# Patient Record
Sex: MALE | Race: WHITE
[De-identification: names, ages, dates, MRNs, and addresses within clinical notes are randomized per-mention and may not be internally consistent; named-entity substitution may affect disease eponyms.]

---

## 2019-03-13 ENCOUNTER — HOSPITAL ENCOUNTER (EMERGENCY)
Dept: HOSPITAL 90 - EDH | Age: 84
Discharge: HOME | End: 2019-03-13
Payer: MEDICARE

## 2019-03-13 DIAGNOSIS — Z95.1: ICD-10-CM
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
## 2019-03-13 DIAGNOSIS — S22.31XA: Primary | ICD-10-CM

## 2019-03-13 DIAGNOSIS — J44.9: ICD-10-CM

## 2019-03-13 DIAGNOSIS — E11.9: ICD-10-CM

## 2019-03-13 DIAGNOSIS — I25.810: ICD-10-CM
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
## 2019-03-13 DIAGNOSIS — Z85.528: ICD-10-CM
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
## 2019-03-13 DIAGNOSIS — Z85.118: ICD-10-CM

## 2019-03-13 DIAGNOSIS — Z98.890: ICD-10-CM

## 2019-03-13 DIAGNOSIS — Z87.891: ICD-10-CM

## 2019-03-13 LAB
ALBUMIN SERPL-MCNC: 3.6 G/DL (ref 3.5–5)
ALT SERPL-CCNC: 39 U/L (ref 12–78)
AST SERPL-CCNC: 42 U/L (ref 10–37)
BASOPHILS NFR BLD AUTO: 1.1 % (ref 0–5)
BILIRUB SERPL-MCNC: 1.4 MG/DL (ref 0.2–1)
BUN SERPL-MCNC: 31 MG/DL (ref 7–18)
CHLORIDE SERPL-SCNC: 101 MMOL/L (ref 101–111)
CO2 SERPL-SCNC: 30 MMOL/L (ref 21–32)
CREAT SERPL-MCNC: 1.6 MG/DL (ref 0.5–1.5)
EOSINOPHIL NFR BLD AUTO: 1.7 % (ref 0–8)
ERYTHROCYTE [DISTWIDTH] IN BLOOD BY AUTOMATED COUNT: 15 % (ref 11–15.5)
GFR SERPL CREATININE-BSD FRML MDRD: 44 ML/MIN (ref 60–?)
GLUCOSE SERPL-MCNC: 131 MG/DL (ref 70–105)
HCT VFR BLD AUTO: 41.9 % (ref 42–54)
LYMPHOCYTES NFR SPEC AUTO: 15.7 % (ref 21–51)
MCH RBC QN AUTO: 33.9 PG (ref 27–33)
MCHC RBC AUTO-ENTMCNC: 33.2 G/DL (ref 32–36)
MCV RBC AUTO: 102.2 FL (ref 79–99)
MONOCYTES NFR BLD AUTO: 8.9 % (ref 3–13)
NEUTROPHILS NFR BLD AUTO: 72.6 % (ref 40–77)
NRBC BLD MANUAL-RTO: 0.1 % (ref 0–0.19)
PLATELET # BLD AUTO: 123 K/UL (ref 130–400)
POTASSIUM SERPL-SCNC: 3 MMOL/L (ref 3.5–5.1)
PROT SERPL-MCNC: 7.2 G/DL (ref 6–8.3)
RBC # BLD AUTO: 4.11 MIL/UL (ref 4.5–6.2)
SODIUM SERPL-SCNC: 141 MMOL/L (ref 136–145)
WBC # BLD AUTO: 7.7 K/UL (ref 4.8–10.8)

## 2019-03-13 PROCEDURE — 84484 ASSAY OF TROPONIN QUANT: CPT

## 2019-03-13 PROCEDURE — 93005 ELECTROCARDIOGRAM TRACING: CPT

## 2019-03-13 PROCEDURE — 71101 X-RAY EXAM UNILAT RIBS/CHEST: CPT

## 2019-03-13 PROCEDURE — 36415 COLL VENOUS BLD VENIPUNCTURE: CPT

## 2019-03-13 PROCEDURE — 85025 COMPLETE CBC W/AUTO DIFF WBC: CPT

## 2019-03-13 PROCEDURE — 80053 COMPREHEN METABOLIC PANEL: CPT

## 2019-03-13 PROCEDURE — 71250 CT THORAX DX C-: CPT

## 2019-03-30 ENCOUNTER — HOSPITAL ENCOUNTER (EMERGENCY)
Dept: HOSPITAL 90 - EDH | Age: 84
Discharge: HOME | End: 2019-03-30
Payer: MEDICARE

## 2019-03-30 DIAGNOSIS — Z85.118: ICD-10-CM

## 2019-03-30 DIAGNOSIS — R09.02: ICD-10-CM

## 2019-03-30 DIAGNOSIS — J44.9: ICD-10-CM

## 2019-03-30 DIAGNOSIS — E87.6: ICD-10-CM

## 2019-03-30 DIAGNOSIS — Z85.528: ICD-10-CM

## 2019-03-30 DIAGNOSIS — I50.1: ICD-10-CM

## 2019-03-30 DIAGNOSIS — M25.461: Primary | ICD-10-CM

## 2019-03-30 DIAGNOSIS — N18.9: ICD-10-CM

## 2019-03-30 DIAGNOSIS — I25.810: ICD-10-CM

## 2019-03-30 DIAGNOSIS — E11.22: ICD-10-CM

## 2019-03-30 LAB
APTT PPP: 30.7 SEC (ref 26.3–35.5)
BASOPHILS NFR BLD AUTO: 1 % (ref 0–5)
BUN SERPL-MCNC: 34 MG/DL (ref 7–18)
CHLORIDE SERPL-SCNC: 100 MMOL/L (ref 101–111)
CO2 SERPL-SCNC: 28 MMOL/L (ref 21–32)
CREAT SERPL-MCNC: 1.7 MG/DL (ref 0.5–1.5)
EOSINOPHIL NFR BLD AUTO: 1.2 % (ref 0–8)
ERYTHROCYTE [DISTWIDTH] IN BLOOD BY AUTOMATED COUNT: 15.5 % (ref 11–15.5)
GFR SERPL CREATININE-BSD FRML MDRD: 41 ML/MIN (ref 60–?)
GLUCOSE SERPL-MCNC: 157 MG/DL (ref 70–105)
HCT VFR BLD AUTO: 41.8 % (ref 42–54)
INR PPP: 1.02 (ref 0.85–1.15)
LYMPHOCYTES NFR SPEC AUTO: 12.7 % (ref 21–51)
MCH RBC QN AUTO: 34.4 PG (ref 27–33)
MCHC RBC AUTO-ENTMCNC: 33.4 G/DL (ref 32–36)
MCV RBC AUTO: 102.9 FL (ref 79–99)
MONOCYTES NFR BLD AUTO: 9.2 % (ref 3–13)
NEUTROPHILS NFR BLD AUTO: 75.9 % (ref 40–77)
NRBC BLD MANUAL-RTO: 0 % (ref 0–0.19)
PLATELET # BLD AUTO: 121 K/UL (ref 130–400)
POTASSIUM SERPL-SCNC: 2.9 MMOL/L (ref 3.5–5.1)
PROTHROMBIN TIME: 10.7 SEC (ref 9.6–11.6)
RBC # BLD AUTO: 4.06 MIL/UL (ref 4.5–6.2)
SODIUM SERPL-SCNC: 139 MMOL/L (ref 136–145)
WBC # BLD AUTO: 10.5 K/UL (ref 4.8–10.8)

## 2019-03-30 PROCEDURE — 73562 X-RAY EXAM OF KNEE 3: CPT

## 2019-03-30 PROCEDURE — 85025 COMPLETE CBC W/AUTO DIFF WBC: CPT

## 2019-03-30 PROCEDURE — 80048 BASIC METABOLIC PNL TOTAL CA: CPT

## 2019-03-30 PROCEDURE — 85730 THROMBOPLASTIN TIME PARTIAL: CPT

## 2019-03-30 PROCEDURE — 36415 COLL VENOUS BLD VENIPUNCTURE: CPT

## 2019-03-30 PROCEDURE — 85610 PROTHROMBIN TIME: CPT

## 2019-04-15 ENCOUNTER — HOSPITAL ENCOUNTER (OUTPATIENT)
Dept: HOSPITAL 90 - RAH | Age: 84
Discharge: HOME | End: 2019-04-15
Attending: INTERNAL MEDICINE
Payer: MEDICARE

## 2019-04-15 DIAGNOSIS — Z53.9: ICD-10-CM

## 2019-04-15 DIAGNOSIS — Z79.01: ICD-10-CM

## 2019-04-15 DIAGNOSIS — R91.8: Primary | ICD-10-CM

## 2019-04-15 DIAGNOSIS — Z79.899: ICD-10-CM

## 2019-04-15 DIAGNOSIS — Z98.890: ICD-10-CM

## 2019-04-15 LAB
ALBUMIN SERPL-MCNC: 3.6 G/DL (ref 3.5–5)
ALT SERPL-CCNC: 51 U/L (ref 12–78)
APTT PPP: 29.2 SEC (ref 26.3–35.5)
AST SERPL-CCNC: 49 U/L (ref 10–37)
BASOPHILS NFR BLD AUTO: 0.9 % (ref 0–5)
BILIRUB SERPL-MCNC: 1.2 MG/DL (ref 0.2–1)
BUN SERPL-MCNC: 37 MG/DL (ref 7–18)
CHLORIDE SERPL-SCNC: 102 MMOL/L (ref 101–111)
CO2 SERPL-SCNC: 32 MMOL/L (ref 21–32)
CREAT SERPL-MCNC: 1.6 MG/DL (ref 0.5–1.5)
EOSINOPHIL NFR BLD AUTO: 2.3 % (ref 0–8)
ERYTHROCYTE [DISTWIDTH] IN BLOOD BY AUTOMATED COUNT: 15.4 % (ref 11–15.5)
GFR SERPL CREATININE-BSD FRML MDRD: 44 ML/MIN (ref 60–?)
GLUCOSE SERPL-MCNC: 143 MG/DL (ref 70–105)
HCT VFR BLD AUTO: 42.7 % (ref 42–54)
INR PPP: 1.04 (ref 0.85–1.15)
LYMPHOCYTES NFR SPEC AUTO: 20.3 % (ref 21–51)
MCH RBC QN AUTO: 35.1 PG (ref 27–33)
MCHC RBC AUTO-ENTMCNC: 34 G/DL (ref 32–36)
MCV RBC AUTO: 103.2 FL (ref 79–99)
MONOCYTES NFR BLD AUTO: 9.6 % (ref 3–13)
NEUTROPHILS NFR BLD AUTO: 66.9 % (ref 40–77)
NRBC BLD MANUAL-RTO: 0.1 % (ref 0–0.19)
PLATELET # BLD AUTO: 111 K/UL (ref 130–400)
POTASSIUM SERPL-SCNC: 4.3 MMOL/L (ref 3.5–5.1)
PROT SERPL-MCNC: 6.8 G/DL (ref 6–8.3)
PROTHROMBIN TIME: 10.9 SEC (ref 9.6–11.6)
RBC # BLD AUTO: 4.14 MIL/UL (ref 4.5–6.2)
SODIUM SERPL-SCNC: 139 MMOL/L (ref 136–145)
WBC # BLD AUTO: 5.4 K/UL (ref 4.8–10.8)

## 2019-04-15 PROCEDURE — 85610 PROTHROMBIN TIME: CPT

## 2019-04-15 PROCEDURE — 71250 CT THORAX DX C-: CPT

## 2019-04-15 PROCEDURE — 80053 COMPREHEN METABOLIC PANEL: CPT

## 2019-04-15 PROCEDURE — 85730 THROMBOPLASTIN TIME PARTIAL: CPT

## 2019-04-15 PROCEDURE — 85025 COMPLETE CBC W/AUTO DIFF WBC: CPT

## 2019-04-15 PROCEDURE — 36415 COLL VENOUS BLD VENIPUNCTURE: CPT

## 2019-04-15 NOTE — NUR
CT GD BX OF LEFT LUNG MASS NOT PERFORMED

CT OF THE CHEST PERFORMED. DR ANGELES READ EXAM AND NOTED THE AREA OF SUSPICION IS 
RESOLVING. DR. ANGELES INFORMED THE PATIENT AND CALLED DR. HURST TO INFORM HIM OF THE CT 
RESULTS. REPORT GIVEN TO JEWELL NGUYEN AND PATIENT TRANSPORTED TO DAYPAT[facility] VIA BED.  PT 
STABLE, AAO X3 WITH NO C/O PAIN.

## 2020-02-27 ENCOUNTER — HOSPITAL ENCOUNTER (OUTPATIENT)
Dept: HOSPITAL 90 - RAH | Age: 85
Discharge: HOME | End: 2020-02-27
Attending: NURSE PRACTITIONER
Payer: MEDICARE

## 2020-02-27 DIAGNOSIS — Z85.9: ICD-10-CM

## 2020-02-27 DIAGNOSIS — I50.9: ICD-10-CM

## 2020-02-27 DIAGNOSIS — J44.9: ICD-10-CM

## 2020-02-27 DIAGNOSIS — R13.12: Primary | ICD-10-CM

## 2020-02-27 DIAGNOSIS — E11.9: ICD-10-CM

## 2020-02-27 PROCEDURE — 74230 X-RAY XM SWLNG FUNCJ C+: CPT

## 2020-02-27 PROCEDURE — 92611 MOTION FLUOROSCOPY/SWALLOW: CPT

## 2020-02-27 NOTE — NUR
MBSS COMPLETED. NON-TRANSIENT PENETRATION WITH THIN LIQUIDS AND TRANSIENT PENETRATION WITH 
NECTAR-THICK LIQUIDS (SILENT). RECOMMEND REGULAR TEXTURE, HONEY-THICK LIQUIDS; PILLS WHOLE 
WITH LIQUIDS (HONEY-THICK). 



RECOMMENDATIONS: 

1. SKILLED SPEECH THERAPY 2-3X WEEK FOR 4-6 WEEKS. 

*RECOMMEND NEUROMUSCULAR ELECTRICAL STIMULATION (NMES) AS THERAPEUTIC INTERVENTION

2. REPEAT MBSS AFTER THERAPEUTIC INTERVENTION.



SLP PROVIDED EDUCATION ON RISKS AND CONSEQUENCES OF ASPIRATION. A WRITTEN HANDOUT WITH 
RESULTS AND RECOMMENDATIONS PROVIDED. SLP DEMONSTRATED HOW TO REACH HONEY-THICK LIQUIDS AND 
A CAN OF THICKENER WAS PROVIDED. ALL QUESTIONS ANSWERED AT THIS TIME. 

-------------------------------------------------------------------------------

Addendum: 02/27/20 at 1151 by KEELEY HILLIARD \[facility] [place] [facility]\""

-------------------------------------------------------------------------------

Amended: Links added.

## 2020-03-12 ENCOUNTER — HOSPITAL ENCOUNTER (OUTPATIENT)
Dept: HOSPITAL 90 - RAH | Age: 85
Discharge: HOME | End: 2020-03-12
Attending: NURSE PRACTITIONER
Payer: MEDICARE

## 2020-03-12 DIAGNOSIS — M47.814: ICD-10-CM

## 2020-03-12 DIAGNOSIS — I70.0: ICD-10-CM

## 2020-03-12 DIAGNOSIS — Z95.0: ICD-10-CM

## 2020-03-12 DIAGNOSIS — I50.9: Primary | ICD-10-CM

## 2020-03-12 PROCEDURE — 71046 X-RAY EXAM CHEST 2 VIEWS: CPT

## 2020-05-19 ENCOUNTER — HOSPITAL ENCOUNTER (OUTPATIENT)
Dept: HOSPITAL 90 - SHCH | Age: 85
Discharge: HOME | End: 2020-05-19
Attending: INTERNAL MEDICINE
Payer: MEDICARE

## 2020-05-19 DIAGNOSIS — I25.10: ICD-10-CM

## 2020-05-19 DIAGNOSIS — I08.8: Primary | ICD-10-CM

## 2020-05-19 PROCEDURE — 93356 MYOCRD STRAIN IMG SPCKL TRCK: CPT

## 2020-05-19 PROCEDURE — 93306 TTE W/DOPPLER COMPLETE: CPT

## 2020-11-10 ENCOUNTER — HOSPITAL ENCOUNTER (OUTPATIENT)
Dept: HOSPITAL 90 - DAH | Age: 85
Discharge: HOME | End: 2020-11-10
Attending: INTERNAL MEDICINE
Payer: MEDICARE

## 2020-11-10 VITALS — DIASTOLIC BLOOD PRESSURE: 59 MMHG | SYSTOLIC BLOOD PRESSURE: 107 MMHG

## 2020-11-10 VITALS — DIASTOLIC BLOOD PRESSURE: 59 MMHG | SYSTOLIC BLOOD PRESSURE: 109 MMHG

## 2020-11-10 VITALS — SYSTOLIC BLOOD PRESSURE: 111 MMHG | DIASTOLIC BLOOD PRESSURE: 63 MMHG

## 2020-11-10 VITALS — BODY MASS INDEX: 23.04 KG/M2 | WEIGHT: 152 LBS | HEIGHT: 68 IN

## 2020-11-10 VITALS — DIASTOLIC BLOOD PRESSURE: 58 MMHG | SYSTOLIC BLOOD PRESSURE: 111 MMHG

## 2020-11-10 VITALS — SYSTOLIC BLOOD PRESSURE: 106 MMHG | DIASTOLIC BLOOD PRESSURE: 66 MMHG

## 2020-11-10 VITALS — DIASTOLIC BLOOD PRESSURE: 61 MMHG | SYSTOLIC BLOOD PRESSURE: 105 MMHG

## 2020-11-10 DIAGNOSIS — Z95.1: ICD-10-CM

## 2020-11-10 DIAGNOSIS — E78.49: ICD-10-CM

## 2020-11-10 DIAGNOSIS — I10: ICD-10-CM

## 2020-11-10 DIAGNOSIS — Z95.5: ICD-10-CM

## 2020-11-10 DIAGNOSIS — K29.50: ICD-10-CM

## 2020-11-10 DIAGNOSIS — Z96.642: ICD-10-CM

## 2020-11-10 DIAGNOSIS — J44.9: ICD-10-CM

## 2020-11-10 DIAGNOSIS — E11.9: ICD-10-CM

## 2020-11-10 DIAGNOSIS — R68.81: ICD-10-CM

## 2020-11-10 DIAGNOSIS — Z85.118: ICD-10-CM

## 2020-11-10 DIAGNOSIS — R10.13: Primary | ICD-10-CM

## 2020-11-10 DIAGNOSIS — M10.9: ICD-10-CM

## 2020-11-10 DIAGNOSIS — K22.2: ICD-10-CM

## 2020-11-10 DIAGNOSIS — I25.10: ICD-10-CM

## 2020-11-10 DIAGNOSIS — Z20.828: ICD-10-CM

## 2020-11-10 PROCEDURE — 88342 IMHCHEM/IMCYTCHM 1ST ANTB: CPT

## 2020-11-10 PROCEDURE — 82948 REAGENT STRIP/BLOOD GLUCOSE: CPT

## 2020-11-10 PROCEDURE — 93005 ELECTROCARDIOGRAM TRACING: CPT

## 2020-11-10 PROCEDURE — 88305 TISSUE EXAM BY PATHOLOGIST: CPT

## 2020-11-10 PROCEDURE — 43239 EGD BIOPSY SINGLE/MULTIPLE: CPT

## 2020-11-10 PROCEDURE — 43248 EGD GUIDE WIRE INSERTION: CPT

## 2020-12-27 ENCOUNTER — HOSPITAL ENCOUNTER (EMERGENCY)
Dept: HOSPITAL 90 - EDH | Age: 85
Discharge: HOME | End: 2020-12-27
Payer: MEDICARE

## 2020-12-27 DIAGNOSIS — H81.4: ICD-10-CM

## 2020-12-27 DIAGNOSIS — S50.812A: ICD-10-CM
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
## 2020-12-27 DIAGNOSIS — J44.9: ICD-10-CM
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
## 2020-12-27 DIAGNOSIS — I25.10: ICD-10-CM

## 2020-12-27 DIAGNOSIS — S50.811A: ICD-10-CM

## 2020-12-27 DIAGNOSIS — E11.9: ICD-10-CM
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
## 2020-12-27 DIAGNOSIS — S00.03XA: Primary | ICD-10-CM
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
## 2020-12-27 DIAGNOSIS — Z85.118: ICD-10-CM

## 2020-12-27 LAB
ALBUMIN SERPL-MCNC: 3.3 G/DL (ref 3.5–5)
ALT SERPL-CCNC: 15 U/L (ref 12–78)
APTT PPP: 29.2 SEC (ref 26.3–35.5)
AST SERPL-CCNC: 30 U/L (ref 10–37)
BASOPHILS NFR BLD AUTO: 0.5 % (ref 0–5)
BILIRUB SERPL-MCNC: 1 MG/DL (ref 0.2–1)
BUN SERPL-MCNC: 37 MG/DL (ref 7–18)
CHLORIDE SERPL-SCNC: 94 MMOL/L (ref 101–111)
CO2 SERPL-SCNC: 31 MMOL/L (ref 21–32)
CREAT SERPL-MCNC: 2 MG/DL (ref 0.5–1.5)
EOSINOPHIL NFR BLD AUTO: 0.2 % (ref 0–8)
ERYTHROCYTE [DISTWIDTH] IN BLOOD BY AUTOMATED COUNT: 15.4 % (ref 11–15.5)
GFR SERPL CREATININE-BSD FRML MDRD: 34 ML/MIN (ref 60–?)
GLUCOSE SERPL-MCNC: 164 MG/DL (ref 70–105)
HCT VFR BLD AUTO: 37 % (ref 42–54)
INR PPP: 1.18 (ref 0.85–1.15)
LYMPHOCYTES NFR SPEC AUTO: 19.8 % (ref 21–51)
MCH RBC QN AUTO: 35.1 PG (ref 27–33)
MCHC RBC AUTO-ENTMCNC: 35.1 G/DL (ref 32–36)
MCV RBC AUTO: 100 FL (ref 79–99)
MONOCYTES NFR BLD AUTO: 7.7 % (ref 3–13)
NEUTROPHILS NFR BLD AUTO: 71.6 % (ref 40–77)
NRBC BLD MANUAL-RTO: 0 % (ref 0–0.19)
PLATELET # BLD AUTO: 110 K/UL (ref 130–400)
POTASSIUM SERPL-SCNC: 3 MMOL/L (ref 3.5–5.1)
PROT SERPL-MCNC: 6.7 G/DL (ref 6–8.3)
PROTHROMBIN TIME: 12.4 SEC (ref 9.6–11.6)
RBC # BLD AUTO: 3.7 MIL/UL (ref 4.5–6.2)
SODIUM SERPL-SCNC: 137 MMOL/L (ref 136–145)
WBC # BLD AUTO: 5.6 K/UL (ref 4.8–10.8)

## 2020-12-27 PROCEDURE — 96374 THER/PROPH/DIAG INJ IV PUSH: CPT

## 2020-12-27 PROCEDURE — 93005 ELECTROCARDIOGRAM TRACING: CPT

## 2020-12-27 PROCEDURE — 72125 CT NECK SPINE W/O DYE: CPT

## 2020-12-27 PROCEDURE — 80053 COMPREHEN METABOLIC PANEL: CPT

## 2020-12-27 PROCEDURE — 85025 COMPLETE CBC W/AUTO DIFF WBC: CPT

## 2020-12-27 PROCEDURE — 36415 COLL VENOUS BLD VENIPUNCTURE: CPT

## 2020-12-27 PROCEDURE — 70450 CT HEAD/BRAIN W/O DYE: CPT

## 2020-12-27 PROCEDURE — 85730 THROMBOPLASTIN TIME PARTIAL: CPT

## 2020-12-27 PROCEDURE — 85610 PROTHROMBIN TIME: CPT

## 2020-12-27 PROCEDURE — 99285 EMERGENCY DEPT VISIT HI MDM: CPT

## 2021-03-04 ENCOUNTER — HOSPITAL ENCOUNTER (OUTPATIENT)
Dept: HOSPITAL 90 - RAH | Age: 86
Discharge: HOME | End: 2021-03-04
Attending: FAMILY MEDICINE
Payer: MEDICARE

## 2021-03-04 DIAGNOSIS — N32.89: ICD-10-CM

## 2021-03-04 DIAGNOSIS — K40.90: ICD-10-CM

## 2021-03-04 DIAGNOSIS — I70.90: ICD-10-CM

## 2021-03-04 DIAGNOSIS — K57.30: Primary | ICD-10-CM

## 2021-03-04 DIAGNOSIS — K46.9: ICD-10-CM

## 2021-03-04 PROCEDURE — 72192 CT PELVIS W/O DYE: CPT
